# Patient Record
Sex: FEMALE | Race: WHITE | Employment: FULL TIME | URBAN - METROPOLITAN AREA
[De-identification: names, ages, dates, MRNs, and addresses within clinical notes are randomized per-mention and may not be internally consistent; named-entity substitution may affect disease eponyms.]

---

## 2018-08-02 ENCOUNTER — OFFICE VISIT (OUTPATIENT)
Dept: DERMATOLOGY CLINIC | Facility: CLINIC | Age: 43
End: 2018-08-02
Payer: COMMERCIAL

## 2018-08-02 DIAGNOSIS — D23.60 BENIGN NEOPLASM OF SKIN OF UPPER EXTREMITY AND SHOULDER, UNSPECIFIED LATERALITY: ICD-10-CM

## 2018-08-02 DIAGNOSIS — L81.4 SOLAR LENTIGO: ICD-10-CM

## 2018-08-02 DIAGNOSIS — D23.4 BENIGN NEOPLASM OF SCALP AND SKIN OF NECK: ICD-10-CM

## 2018-08-02 DIAGNOSIS — L71.9 ROSACEA: Primary | ICD-10-CM

## 2018-08-02 DIAGNOSIS — D23.30 BENIGN NEOPLASM OF SKIN OF FACE: ICD-10-CM

## 2018-08-02 DIAGNOSIS — D23.70 BENIGN NEOPLASM OF SKIN OF LOWER EXTREMITY, INCLUDING HIP, UNSPECIFIED LATERALITY: ICD-10-CM

## 2018-08-02 DIAGNOSIS — D23.5 BENIGN NEOPLASM OF SKIN OF TRUNK, EXCEPT SCROTUM: ICD-10-CM

## 2018-08-02 DIAGNOSIS — L82.1 SEBORRHEIC KERATOSES: ICD-10-CM

## 2018-08-02 PROCEDURE — 99202 OFFICE O/P NEW SF 15 MIN: CPT | Performed by: DERMATOLOGY

## 2018-08-02 PROCEDURE — 99212 OFFICE O/P EST SF 10 MIN: CPT | Performed by: DERMATOLOGY

## 2018-08-02 NOTE — PROGRESS NOTES
HPI:     Chief Complaint     Full Skin Exam        HPI     Full Skin Exam    Additional comments: New pt (LOV: 02/22/11). Pt presents for a full skin exam, pt denies personal and family hx of skin cancer.         Last edited by Abraham Matthew [address] [place] [address] [place] on 8/2/201 tobacco: Never Used    Alcohol use Not on file    Drug use: No    Sexual activity: Not on file     Other Topics Concern    Reaction to local anesthetic No     Social History Narrative   None on file     Family History   Problem Relation Age of Onset   • Ca one on the back of the leg. Recommend that she take a photo to be sure there is no change.   Suspect the lighter color is just from the past trauma and possibly from scarring  Seborrheic keratoses-reassurance–no treatment  Benign neoplasm of scalp and skin

## 2018-08-02 NOTE — PROGRESS NOTES
History reviewed. No pertinent past medical history. History reviewed. No pertinent surgical history.     Social History  Social History   Marital status:   Spouse name: N/A    Years of education: N/A  Number of children: N/A     Occupational Histor

## 2019-08-06 ENCOUNTER — OFFICE VISIT (OUTPATIENT)
Dept: DERMATOLOGY CLINIC | Facility: CLINIC | Age: 44
End: 2019-08-06
Payer: COMMERCIAL

## 2019-08-06 DIAGNOSIS — L81.4 SOLAR LENTIGO: ICD-10-CM

## 2019-08-06 DIAGNOSIS — D23.5 BENIGN NEOPLASM OF SKIN OF TRUNK, EXCEPT SCROTUM: ICD-10-CM

## 2019-08-06 DIAGNOSIS — D23.4 BENIGN NEOPLASM OF SCALP AND SKIN OF NECK: ICD-10-CM

## 2019-08-06 DIAGNOSIS — D23.30 BENIGN NEOPLASM OF SKIN OF FACE: ICD-10-CM

## 2019-08-06 DIAGNOSIS — L82.1 SEBORRHEIC KERATOSES: ICD-10-CM

## 2019-08-06 DIAGNOSIS — D23.60 BENIGN NEOPLASM OF SKIN OF UPPER EXTREMITY AND SHOULDER, UNSPECIFIED LATERALITY: ICD-10-CM

## 2019-08-06 DIAGNOSIS — D48.5 NEOPLASM OF UNCERTAIN BEHAVIOR OF SKIN: Primary | ICD-10-CM

## 2019-08-06 DIAGNOSIS — D23.70 BENIGN NEOPLASM OF SKIN OF LOWER EXTREMITY, INCLUDING HIP, UNSPECIFIED LATERALITY: ICD-10-CM

## 2019-08-06 PROCEDURE — 99213 OFFICE O/P EST LOW 20 MIN: CPT | Performed by: DERMATOLOGY

## 2019-08-06 PROCEDURE — 88305 TISSUE EXAM BY PATHOLOGIST: CPT | Performed by: DERMATOLOGY

## 2019-08-06 PROCEDURE — 11103 TANGNTL BX SKIN EA SEP/ADDL: CPT | Performed by: DERMATOLOGY

## 2019-08-06 PROCEDURE — 11102 TANGNTL BX SKIN SINGLE LES: CPT | Performed by: DERMATOLOGY

## 2019-08-06 RX ORDER — TRIAMTERENE AND HYDROCHLOROTHIAZIDE 37.5; 25 MG/1; MG/1
CAPSULE ORAL
COMMUNITY
Start: 2018-10-23

## 2019-08-06 RX ORDER — MEDROXYPROGESTERONE ACETATE 10 MG/1
10 TABLET ORAL
COMMUNITY

## 2019-08-06 RX ORDER — LEVOTHYROXINE SODIUM 0.05 MG/1
TABLET ORAL
COMMUNITY

## 2019-08-06 RX ORDER — TRAMADOL HYDROCHLORIDE 50 MG/1
50 TABLET ORAL
COMMUNITY
Start: 2019-02-06

## 2019-08-06 RX ORDER — ALPRAZOLAM 0.5 MG/1
TABLET ORAL
COMMUNITY
Start: 2018-10-10

## 2019-08-06 NOTE — PROGRESS NOTES
HPI:     Chief Complaint     Full Skin Exam        HPI     Full Skin Exam      Additional comments: LOV 8/2/18. pt presenting today with full body skin check.  Denies faily and personal HX of skin cancer          Last edited by Luis E Rush, [address] [facility] Olga Roper on 8/6/2 mouth. Disp:  Rfl:    metRONIDAZOLE 0.75 % External Cream Apply 1 Application topically daily. Disp: 45 g Rfl: 6   CITRANATAL 90 DHA 90-1 & 250 MG OR MISC use as directed Disp: 90 day Rfl: 3     Allergies:   No Known Allergies    History reviewed.  No perti Problem Relation Age of Onset   • Cancer Mother        PHYSICAL EXAM:   Physical Exam  A complete body exam was performed, including face, neck, chest , back, abdomen, r upper extremity, l upper extremity, buttocks, genital area, l lower extremity and ri working and to reapply it every few hours.       Orders Placed This Encounter      TANGENTIAL BIOPSY SKIN SINGLE LESION      TANGENTIAL BIOPSY SKIN EA SEP/ADDITIONAL LESION      Specimen to Pathology, Tissue [[facility] Pt to [facility]]      Results From Past 50

## 2019-08-16 ENCOUNTER — TELEPHONE (OUTPATIENT)
Dept: DERMATOLOGY CLINIC | Facility: CLINIC | Age: 44
End: 2019-08-16

## 2019-08-16 NOTE — TELEPHONE ENCOUNTER
Test result letter was sent on 8/9/19 with the following information:    The report of the Biopsies done on 8/6/19 shows a compound nevus (right posterior lower leg) and intradermal nevus (left hip) .   These are benign (not cancerous) growths, and requires

## (undated) NOTE — LETTER
8/9/2019              [address] [place] [address],[address]         Dear Heaven Sawyer,      The report of the Biopsies done on 8/6/19 shows a compound nevus (right posterior lower leg) and intradermal nevus (left hip) .   These are b